# Patient Record
Sex: FEMALE | Race: WHITE | Employment: UNEMPLOYED | URBAN - NONMETROPOLITAN AREA
[De-identification: names, ages, dates, MRNs, and addresses within clinical notes are randomized per-mention and may not be internally consistent; named-entity substitution may affect disease eponyms.]

---

## 2022-05-04 DIAGNOSIS — E11.69 TYPE 2 DIABETES MELLITUS WITH OTHER SPECIFIED COMPLICATION, UNSPECIFIED WHETHER LONG TERM INSULIN USE (HCC): ICD-10-CM

## 2022-05-04 DIAGNOSIS — M51.36 DDD (DEGENERATIVE DISC DISEASE), LUMBAR: ICD-10-CM

## 2022-05-04 DIAGNOSIS — E78.2 MIXED HYPERLIPIDEMIA: ICD-10-CM

## 2022-05-04 DIAGNOSIS — M51.26 LUMBAR DISC HERNIATION: ICD-10-CM

## 2022-05-04 DIAGNOSIS — M25.552 BILATERAL HIP PAIN: ICD-10-CM

## 2022-05-04 DIAGNOSIS — E11.40 CONTROLLED TYPE 2 DIABETES MELLITUS WITH DIABETIC NEUROPATHY, UNSPECIFIED WHETHER LONG TERM INSULIN USE (HCC): ICD-10-CM

## 2022-05-04 DIAGNOSIS — G89.29 CHRONIC LOW BACK PAIN, UNSPECIFIED BACK PAIN LATERALITY, UNSPECIFIED WHETHER SCIATICA PRESENT: ICD-10-CM

## 2022-05-04 DIAGNOSIS — Q60.2 CONGENITAL ABSENCE OF KIDNEY: ICD-10-CM

## 2022-05-04 DIAGNOSIS — M25.551 BILATERAL HIP PAIN: ICD-10-CM

## 2022-05-04 DIAGNOSIS — M54.50 CHRONIC LOW BACK PAIN, UNSPECIFIED BACK PAIN LATERALITY, UNSPECIFIED WHETHER SCIATICA PRESENT: ICD-10-CM

## 2022-05-05 PROBLEM — M51.369 DDD (DEGENERATIVE DISC DISEASE), LUMBAR: Status: ACTIVE | Noted: 2022-05-05

## 2022-05-05 PROBLEM — E11.49 DM (DIABETES MELLITUS) TYPE II CONTROLLED, NEUROLOGICAL MANIFESTATION (HCC): Status: ACTIVE | Noted: 2022-05-05

## 2022-05-05 PROBLEM — E78.2 MIXED HYPERLIPIDEMIA: Status: ACTIVE | Noted: 2022-05-05

## 2022-05-05 PROBLEM — M25.551 BILATERAL HIP PAIN: Status: ACTIVE | Noted: 2022-05-05

## 2022-05-05 PROBLEM — M25.552 BILATERAL HIP PAIN: Status: ACTIVE | Noted: 2022-05-05

## 2022-05-05 PROBLEM — M54.50 CHRONIC LOW BACK PAIN: Status: ACTIVE | Noted: 2022-05-05

## 2022-05-05 PROBLEM — Q60.2 CONGENITAL ABSENCE OF KIDNEY: Status: ACTIVE | Noted: 2022-05-05

## 2022-05-05 PROBLEM — M51.36 DDD (DEGENERATIVE DISC DISEASE), LUMBAR: Status: ACTIVE | Noted: 2022-05-05

## 2022-05-05 PROBLEM — E11.69 TYPE 2 DIABETES MELLITUS WITH OTHER SPECIFIED COMPLICATION (HCC): Status: ACTIVE | Noted: 2022-05-05

## 2022-05-05 PROBLEM — G89.29 CHRONIC LOW BACK PAIN: Status: ACTIVE | Noted: 2022-05-05

## 2022-05-05 PROBLEM — M51.26 LUMBAR DISC HERNIATION: Status: ACTIVE | Noted: 2022-05-05

## 2022-05-05 RX ORDER — CHLORAL HYDRATE 500 MG
CAPSULE ORAL
COMMUNITY

## 2022-05-05 RX ORDER — IBUPROFEN 800 MG/1
800 TABLET ORAL EVERY 6 HOURS PRN
COMMUNITY
End: 2022-05-23

## 2022-05-05 RX ORDER — LOSARTAN POTASSIUM 50 MG/1
50 TABLET ORAL DAILY
COMMUNITY
End: 2022-11-01 | Stop reason: CLARIF

## 2022-05-23 ENCOUNTER — HOSPITAL ENCOUNTER (OUTPATIENT)
Dept: GENERAL RADIOLOGY | Age: 52
Discharge: HOME | End: 2022-05-25
Payer: COMMERCIAL

## 2022-05-23 ENCOUNTER — OFFICE VISIT (OUTPATIENT)
Dept: PAIN MANAGEMENT | Age: 52
End: 2022-05-23
Payer: COMMERCIAL

## 2022-05-23 VITALS
BODY MASS INDEX: 41.72 KG/M2 | HEIGHT: 67 IN | SYSTOLIC BLOOD PRESSURE: 112 MMHG | OXYGEN SATURATION: 95 % | HEART RATE: 93 BPM | WEIGHT: 265.8 LBS | DIASTOLIC BLOOD PRESSURE: 64 MMHG

## 2022-05-23 DIAGNOSIS — M54.16 LUMBAR RADICULOPATHY: ICD-10-CM

## 2022-05-23 DIAGNOSIS — M54.41 CHRONIC BILATERAL LOW BACK PAIN WITH BILATERAL SCIATICA: ICD-10-CM

## 2022-05-23 DIAGNOSIS — M16.0 BILATERAL PRIMARY OSTEOARTHRITIS OF HIP: ICD-10-CM

## 2022-05-23 DIAGNOSIS — M54.41 CHRONIC BILATERAL LOW BACK PAIN WITH BILATERAL SCIATICA: Primary | ICD-10-CM

## 2022-05-23 DIAGNOSIS — M54.42 CHRONIC BILATERAL LOW BACK PAIN WITH BILATERAL SCIATICA: Primary | ICD-10-CM

## 2022-05-23 DIAGNOSIS — M54.16 LUMBAR RADICULOPATHY: Primary | ICD-10-CM

## 2022-05-23 DIAGNOSIS — G89.29 CHRONIC BILATERAL LOW BACK PAIN WITH BILATERAL SCIATICA: Primary | ICD-10-CM

## 2022-05-23 DIAGNOSIS — M51.36 DDD (DEGENERATIVE DISC DISEASE), LUMBAR: ICD-10-CM

## 2022-05-23 DIAGNOSIS — G89.29 CHRONIC BILATERAL LOW BACK PAIN WITH BILATERAL SCIATICA: ICD-10-CM

## 2022-05-23 DIAGNOSIS — M54.42 CHRONIC BILATERAL LOW BACK PAIN WITH BILATERAL SCIATICA: ICD-10-CM

## 2022-05-23 PROCEDURE — 99213 OFFICE O/P EST LOW 20 MIN: CPT | Performed by: NURSE PRACTITIONER

## 2022-05-23 PROCEDURE — G8417 CALC BMI ABV UP PARAM F/U: HCPCS | Performed by: NURSE PRACTITIONER

## 2022-05-23 PROCEDURE — 4004F PT TOBACCO SCREEN RCVD TLK: CPT | Performed by: NURSE PRACTITIONER

## 2022-05-23 PROCEDURE — G8427 DOCREV CUR MEDS BY ELIG CLIN: HCPCS | Performed by: NURSE PRACTITIONER

## 2022-05-23 PROCEDURE — 72100 X-RAY EXAM L-S SPINE 2/3 VWS: CPT

## 2022-05-23 PROCEDURE — 3017F COLORECTAL CA SCREEN DOC REV: CPT | Performed by: NURSE PRACTITIONER

## 2022-05-23 PROCEDURE — 99204 OFFICE O/P NEW MOD 45 MIN: CPT | Performed by: NURSE PRACTITIONER

## 2022-05-23 RX ORDER — DICLOFENAC SODIUM 75 MG/1
75 TABLET, DELAYED RELEASE ORAL 2 TIMES DAILY
Qty: 60 TABLET | Refills: 3 | Status: SHIPPED
Start: 2022-05-23 | End: 2022-08-04

## 2022-05-23 RX ORDER — LIRAGLUTIDE 6 MG/ML
INJECTION SUBCUTANEOUS
COMMUNITY
Start: 2022-04-27

## 2022-05-23 RX ORDER — LOSARTAN POTASSIUM AND HYDROCHLOROTHIAZIDE 25; 100 MG/1; MG/1
TABLET ORAL
COMMUNITY
Start: 2022-03-30 | End: 2022-09-09 | Stop reason: ALTCHOICE

## 2022-05-23 RX ORDER — ATORVASTATIN CALCIUM 40 MG/1
TABLET, FILM COATED ORAL
COMMUNITY
Start: 2022-05-02

## 2022-05-23 ASSESSMENT — ENCOUNTER SYMPTOMS
GASTROINTESTINAL NEGATIVE: 1
RESPIRATORY NEGATIVE: 1
BACK PAIN: 1
SORE THROAT: 0

## 2022-05-23 NOTE — PROGRESS NOTES
Subjective:      Patient ID: Brenda Leon is a 46 y.o. female. Chief Complaint   Patient presents with    New Patient     refer Dr. Dipak Kenny Low back bilataral leg pain     HPI Initial new patient consult. Chronic low back pain for years, radiates into legs, left side is worse, worsens as she ages. Ibuprofen prn, takes the edge off. Her goal is to return to work. 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 Standing on feet 10-12 hours per day    She has done physical therapy. No relief.  No recent lumbar imaging     Pain Assessment  Location of Pain: Back  Location Modifiers: Left,Right  Severity of Pain: 7  Quality of Pain: Aching  Duration of Pain: Persistent  Frequency of Pain: Constant  Aggravating Factors: Stretching,Bending,Walking,Stairs,Standing  Limiting Behavior: Yes  Relieving Factors: Rest,Ice,Heat,Nsaids  Result of Injury: No  Work-Related Injury: No    No Known Allergies    Outpatient Medications Marked as Taking for the 5/23/22 encounter (Office Visit) with MONI Mcdonald - CNP   Medication Sig Dispense Refill    atorvastatin (LIPITOR) 40 MG tablet       VICTOZA 18 MG/3ML SOPN SC injection       losartan-hydroCHLOROthiazide (HYZAAR) 100-25 MG per tablet       diclofenac (VOLTAREN) 75 MG EC tablet Take 1 tablet by mouth 2 times daily 60 tablet 3    losartan (COZAAR) 50 MG tablet Take 50 mg by mouth daily      metFORMIN (GLUCOPHAGE) 500 MG tablet Take 500 mg by mouth 2 times daily (with meals)      Omega-3 1000 MG CAPS Take by mouth         Past Medical History:   Diagnosis Date    Chronic pain     Type 2 diabetes mellitus ([address]        Past Surgical History:   Procedure Laterality Date    CARPAL TUNNEL RELEASE  2016    HERNIA REPAIR  2018    OTHER SURGICAL HISTORY  2018    RFA     TONSILLECTOMY  2010    TUBAL LIGATION         Family History   Problem Relation Age of Onset    COPD Mother     Hypertension Mother     Hypertension Father     Diabetes Father        Social History     Socioeconomic History    Marital status: Single     Spouse name: None    Number of children: None    Years of education: None    Highest education level: None   Occupational History    None   Tobacco Use    Smoking status: Current Every Day Smoker     Packs/day: 1.00     Types: Cigarettes    Smokeless tobacco: Never Used   Substance and Sexual Activity    Alcohol use: Never    Drug use: Never    Sexual activity: None   Other Topics Concern    None   Social History Narrative    None     Social Determinants of Health     Financial Resource Strain:     Difficulty of Paying Living Expenses: Not on file   Food Insecurity:     Worried About Running Out of Food in the Last Year: Not on file    Yarelis of Food in the Last Year: Not on file   Transportation Needs:     Lack of Transportation (Medical): Not on file    Lack of Transportation (Non-Medical): Not on file   Physical Activity:     Days of Exercise per Week: Not on file    Minutes of Exercise per Session: Not on file   Stress:     Feeling of Stress : Not on file   Social Connections:     Frequency of Communication with Friends and Family: Not on file    Frequency of Social Gatherings with Friends and Family: Not on file    Attends [employer]ism Services: Not on file    Active Member of [address] [place] [address] [employer] or Organizations: Not on file    Attends Club or Organization Meetings: Not on file    Marital Status: Not on file   Intimate Partner Violence:     Fear of Current or Ex-Partner: Not on file    Emotionally Abused: Not on file    Physically Abused: Not on file    Sexually Abused: Not on file   Housing Stability:     Unable to Pay for Housing in the Last Year: Not on file    Number of [place] in the Last Year: Not on file    Unstable Housing in the Last Year: Not on file     Review of Systems   Constitutional: Positive for activity change. Negative for fever.    HENT: Negative for sore throat. Respiratory: Negative. Cardiovascular: Negative for leg swelling. Gastrointestinal: Negative. Genitourinary: Negative. Musculoskeletal: Positive for arthralgias, back pain and myalgias. Skin: Negative. Neurological: Positive for weakness (both knees). Hematological: Bruises/bleeds easily. Psychiatric/Behavioral: Positive for sleep disturbance. Objective:   Physical Exam  Vitals reviewed. Constitutional:       General: She is awake. She is not in acute distress. Appearance: Normal appearance. She is well-developed. She is obese. She is not ill-appearing, toxic-appearing or diaphoretic. Interventions: She is not intubated. HENT:      Head: Normocephalic and atraumatic. Right Ear: External ear normal.      Left Ear: External ear normal.   Eyes:      General: Lids are normal.   Cardiovascular:      Rate and Rhythm: Normal rate. Pulmonary:      Effort: Pulmonary effort is normal. No tachypnea, bradypnea, accessory muscle usage, prolonged expiration, respiratory distress or retractions. She is not intubated. Abdominal:      General: Abdomen is protuberant. Palpations: Abdomen is soft. Musculoskeletal:      Lumbar back: Tenderness (left gluteal myofascia) present. Decreased range of motion (flexion is worse than extension). Skin:     General: Skin is warm and dry. Capillary Refill: Capillary refill takes less than 2 seconds. Coloration: Skin is not ashen, jaundiced or pale. Findings: No rash. Nails: There is no clubbing. Neurological:      Mental Status: She is alert and oriented to person, place, and time. GCS: GCS eye subscore is 4. GCS verbal subscore is 5. GCS motor subscore is 6. Cranial Nerves: No cranial nerve deficit.    Psychiatric:         Attention and Perception: Attention and perception normal.         Mood and Affect: Mood and affect normal.         Speech: Speech normal.         Behavior: Behavior is cooperative. Cognition and Memory: Cognition normal.         Judgment: Judgment normal.         Assessment / Plan:       Diagnosis Orders   1. Chronic bilateral low back pain with bilateral sciatica  XR LUMBAR SPINE (2-3 VIEWS)    diclofenac (VOLTAREN) 75 MG EC tablet   2. DDD (degenerative disc disease), lumbar     3. Bilateral primary osteoarthritis of hip     4. Lumbar radiculopathy       Hip xrays positive for osteoarthritis. Will obtain lumbar spine xray, likely MRI. To start diclofenac 75mg bid.  RTC 1 month

## 2022-08-04 ENCOUNTER — TELEPHONE (OUTPATIENT)
Dept: PAIN MANAGEMENT | Age: 52
End: 2022-08-04

## 2022-08-04 RX ORDER — GABAPENTIN 100 MG/1
CAPSULE ORAL
Qty: 90 CAPSULE | Refills: 1 | Status: SHIPPED | OUTPATIENT
Start: 2022-08-04 | End: 2022-09-09

## 2022-08-04 NOTE — TELEPHONE ENCOUNTER
The patient called because she cannot take Diclofenac as she only has one Kidney and her provider took her off of it. Is there an alternative that is better on her kidneys. She stated that his only advise was tylenol.      Last Appt:  5/23/2022  Next Appt:   Visit date not found  [facility]

## 2022-09-09 ENCOUNTER — OFFICE VISIT (OUTPATIENT)
Dept: PAIN MANAGEMENT | Age: 52
End: 2022-09-09
Payer: COMMERCIAL

## 2022-09-09 ENCOUNTER — TELEPHONE (OUTPATIENT)
Dept: PAIN MANAGEMENT | Age: 52
End: 2022-09-09

## 2022-09-09 VITALS
WEIGHT: 257.4 LBS | SYSTOLIC BLOOD PRESSURE: 130 MMHG | RESPIRATION RATE: 16 BRPM | BODY MASS INDEX: 40.31 KG/M2 | DIASTOLIC BLOOD PRESSURE: 80 MMHG | HEART RATE: 95 BPM | OXYGEN SATURATION: 95 %

## 2022-09-09 DIAGNOSIS — M47.816 LUMBAR FACET JOINT SYNDROME: Primary | ICD-10-CM

## 2022-09-09 DIAGNOSIS — M54.50 CHRONIC LOW BACK PAIN, UNSPECIFIED BACK PAIN LATERALITY, UNSPECIFIED WHETHER SCIATICA PRESENT: ICD-10-CM

## 2022-09-09 DIAGNOSIS — M51.26 LUMBAR DISC HERNIATION: ICD-10-CM

## 2022-09-09 DIAGNOSIS — M51.36 DDD (DEGENERATIVE DISC DISEASE), LUMBAR: ICD-10-CM

## 2022-09-09 DIAGNOSIS — M54.16 LUMBAR RADICULOPATHY: ICD-10-CM

## 2022-09-09 DIAGNOSIS — G89.29 CHRONIC LOW BACK PAIN, UNSPECIFIED BACK PAIN LATERALITY, UNSPECIFIED WHETHER SCIATICA PRESENT: ICD-10-CM

## 2022-09-09 PROCEDURE — G8427 DOCREV CUR MEDS BY ELIG CLIN: HCPCS | Performed by: NURSE PRACTITIONER

## 2022-09-09 PROCEDURE — 99215 OFFICE O/P EST HI 40 MIN: CPT | Performed by: NURSE PRACTITIONER

## 2022-09-09 PROCEDURE — G8417 CALC BMI ABV UP PARAM F/U: HCPCS | Performed by: NURSE PRACTITIONER

## 2022-09-09 PROCEDURE — 99214 OFFICE O/P EST MOD 30 MIN: CPT | Performed by: NURSE PRACTITIONER

## 2022-09-09 PROCEDURE — 4004F PT TOBACCO SCREEN RCVD TLK: CPT | Performed by: NURSE PRACTITIONER

## 2022-09-09 PROCEDURE — 3017F COLORECTAL CA SCREEN DOC REV: CPT | Performed by: NURSE PRACTITIONER

## 2022-09-09 RX ORDER — SITAGLIPTIN 100 MG/1
TABLET, FILM COATED ORAL
COMMUNITY
Start: 2022-09-03

## 2022-09-09 RX ORDER — METFORMIN HYDROCHLORIDE 500 MG/1
TABLET, EXTENDED RELEASE ORAL
COMMUNITY
Start: 2022-06-06 | End: 2022-11-01 | Stop reason: ALTCHOICE

## 2022-09-09 RX ORDER — DAPAGLIFLOZIN 5 MG/1
TABLET, FILM COATED ORAL
COMMUNITY
Start: 2022-08-31

## 2022-09-09 RX ORDER — TRAMADOL HYDROCHLORIDE 50 MG/1
50 TABLET ORAL 2 TIMES DAILY PRN
Qty: 60 TABLET | Refills: 0 | Status: SHIPPED | OUTPATIENT
Start: 2022-09-09 | End: 2022-10-09

## 2022-09-09 RX ORDER — DICLOFENAC SODIUM 75 MG/1
TABLET, DELAYED RELEASE ORAL
COMMUNITY
Start: 2022-08-19 | End: 2022-09-09

## 2022-09-09 ASSESSMENT — PATIENT HEALTH QUESTIONNAIRE - PHQ9
SUM OF ALL RESPONSES TO PHQ QUESTIONS 1-9: 0
SUM OF ALL RESPONSES TO PHQ QUESTIONS 1-9: 0
1. LITTLE INTEREST OR PLEASURE IN DOING THINGS: 0
2. FEELING DOWN, DEPRESSED OR HOPELESS: 0
SUM OF ALL RESPONSES TO PHQ9 QUESTIONS 1 & 2: 0
SUM OF ALL RESPONSES TO PHQ QUESTIONS 1-9: 0
SUM OF ALL RESPONSES TO PHQ QUESTIONS 1-9: 0

## 2022-09-09 ASSESSMENT — ENCOUNTER SYMPTOMS
SORE THROAT: 0
RESPIRATORY NEGATIVE: 1
GASTROINTESTINAL NEGATIVE: 1
BACK PAIN: 1

## 2022-09-09 NOTE — PROGRESS NOTES
Subjective:      Patient ID: Tho Montoya is a 46 y.o. female. Chief Complaint   Patient presents with    Back Pain     Back pain and Rx ineffective. HPI Follow up after initial new patient consult. Chronic low back pain for years, radiates into legs, left side is worse, worsens as she ages. Ibuprofen prn, takes the edge off-one kidney, poor NSAID candidate. Started on gabapentin, no relief, makes her drowsy at lower dose. Failed cymbalta in the past, alleviated neuropathic pain, but then stopped working. Her goal is to return to work. 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 Standing on feet 10-12 hours per day    She has done physical therapy. No relief. No recent lumbar imaging. Evaluated neurosurgeon, she is not a surgical candidate. Pain Assessment  Location of Pain: Back  Location Modifiers: Medial, Left, Right, Posterior, Inferior  Severity of Pain: 7  Quality of Pain: Aching  Duration of Pain: Persistent  Frequency of Pain: Constant  Aggravating Factors: Standing  Limiting Behavior: Yes  Relieving Factors: Rest  Result of Injury: No  Work-Related Injury: No    No Known Allergies    Outpatient Medications Marked as Taking for the 9/9/22 encounter (Office Visit) with MONI Mcintyre CNP   Medication Sig Dispense Refill    FARXIGA 5 MG tablet       JANUVIA 100 MG tablet       traMADol (ULTRAM) 50 MG tablet Take 1 tablet by mouth 2 times daily as needed for Pain for up to 30 days.  60 tablet 0    atorvastatin (LIPITOR) 40 MG tablet       VICTOZA 18 MG/3ML SOPN SC injection       losartan (COZAAR) 50 MG tablet Take 50 mg by mouth daily      Omega-3 1000 MG CAPS Take by mouth         Past Medical History:   Diagnosis Date    Chronic pain     Type 2 diabetes mellitus ([facility] [address]        Past Surgical History:   Procedure Laterality Date    CARPAL TUNNEL RELEASE  2016    HERNIA REPAIR  2018    OTHER SURGICAL HISTORY  2018 RFA     TONSILLECTOMY  2010    TUBAL LIGATION         Family History   Problem Relation Age of Onset    COPD Mother     Hypertension Mother     Hypertension Father     Diabetes Father        Social History     Socioeconomic History    Marital status: Single     Spouse name: None    Number of children: None    Years of education: None    Highest education level: None   Tobacco Use    Smoking status: Every Day     Packs/day: 1.00     Types: Cigarettes    Smokeless tobacco: Never    Tobacco comments: Will see PCP PRN cessation needs. Substance and Sexual Activity    Alcohol use: Never    Drug use: Never     Review of Systems   Constitutional:  Positive for activity change. Negative for fever. HENT:  Negative for sore throat. Respiratory: Negative. Cardiovascular:  Negative for leg swelling. Gastrointestinal: Negative. Genitourinary: Negative. Musculoskeletal:  Positive for arthralgias, back pain and myalgias. Skin: Negative. Neurological:  Positive for weakness (both knees). Hematological:  Bruises/bleeds easily. Psychiatric/Behavioral:  Positive for sleep disturbance. Objective:   Physical Exam  Vitals reviewed. Constitutional:       General: She is awake. She is not in acute distress. Appearance: Normal appearance. She is well-developed. She is obese. She is not ill-appearing, toxic-appearing or diaphoretic. Interventions: She is not intubated. HENT:      Head: Normocephalic and atraumatic. Right Ear: External ear normal.      Left Ear: External ear normal.   Eyes:      General: Lids are normal.   Cardiovascular:      Rate and Rhythm: Normal rate. Pulmonary:      Effort: Pulmonary effort is normal. No tachypnea, bradypnea, accessory muscle usage, prolonged expiration, respiratory distress or retractions. She is not intubated. Abdominal:      General: Abdomen is protuberant. Palpations: Abdomen is soft.    Musculoskeletal:      Lumbar back: Tenderness (left gluteal myofascia) present. Decreased range of motion (flexion is worse than extension). Skin:     General: Skin is warm and dry. Capillary Refill: Capillary refill takes less than 2 seconds. Coloration: Skin is not ashen, jaundiced or pale. Findings: No rash. Nails: There is no clubbing. Neurological:      Mental Status: She is alert and oriented to person, place, and time. GCS: GCS eye subscore is 4. GCS verbal subscore is 5. GCS motor subscore is 6. Cranial Nerves: No cranial nerve deficit. Psychiatric:         Attention and Perception: Attention and perception normal.         Mood and Affect: Mood and affect normal.         Speech: Speech normal.         Behavior: Behavior is cooperative. Cognition and Memory: Cognition normal.         Judgment: Judgment normal.       Assessment / Plan:       Diagnosis Orders   1. Lumbar facet joint syndrome  traMADol (ULTRAM) 50 MG tablet      2. DDD (degenerative disc disease), lumbar  traMADol (ULTRAM) 50 MG tablet      3. Lumbar radiculopathy  traMADol (ULTRAM) 50 MG tablet      4. Lumbar disc herniation  traMADol (ULTRAM) 50 MG tablet      5. Chronic low back pain, unspecified back pain laterality, unspecified whether sciatica present  traMADol (ULTRAM) 50 MG tablet        Discontinue gabapentin  Tramadol bid prn  Schedule bilateral L4/L5 and L5/S1 diagnostic medial branch block    Informed consent has not been obtained for procedure. Sona Shaggy not on blood thinners. Medications to hold have been reviewed with patient. Blood thinners must be held with permission from your cardiologist or primary care physician. A letter is not required to besent to PCP/Cardiologist regarding holding medications for procedure to decrease bleeding risk.        Controlled Substance Monitoring:    Acute and Chronic Pain Monitoring:   RX Monitoring 9/9/2022   Periodic Controlled Substance Monitoring No signs of potential drug abuse or diversion identified.

## 2022-09-09 NOTE — TELEPHONE ENCOUNTER
Bilateral L4-L5 L5-S1 Facet  with IV  sedation scheduled for 9/23/22 with surgery center notified. Patient Educated to have .

## 2022-09-09 NOTE — PATIENT INSTRUCTIONS
[address]  Telephone [phone]  Fax [phone]    PROCEDURE INSTRUCTIONS FOR  PAIN MANAGEMENT PROCEDURES WITH ANESTHESIA/ IV SEDATION    Taiwo Sutherland is scheduled to see Dr. Joaquin Renae to undergo the following procedure:   Bilateral L4-L5 L5-S1 Facet Injection     Procedure Date: ____9/23/22____  You will receive a phone call to schedule COVID 19 testing. You will receive a phone call the day prior to your procedure to confirm a time or arrival.    Report to the [place] [address], Registration office on the 1st floor in the hospital, after check in and signing of paper work you will then go to the second floor to the surgery center. 1. Stop the following medications prior to the procedure:   none  Stop blood thinners as directed before the injection, with permission from your cardiologist or primary care physician. We will send a letter to them requesting permission to hold the blood thinners. If you take Warfarin (Coumadin), you must have your blood drawn for an INR the day before the procedure. INR must be less than 1.5. If not completed prior to check in for the procedure this will be completed by the procedure staff before the procedure can be complete. 2.  Take all routine medications unless otherwise instructed. Ok to take vitamins and antiinflammatory medications    3. EATING & DRINKING:  YOUR PROCEDURE REQUIRES ANESTHESIA, NO FOOD OR LIQUIDS FOR 8 HOURS PRIOR TO THE PROCEDURE    4. If you are allergic to contrast or iodine, you must take benadryl and prednisone prior to the injection to prevent an allergic reaction. Follow the directions on the prescription for the times to take the medication. 5.  Wear simple loose clothing, which can be easily changed. 6.  Leave jewelry (including rings) and other valuables at home. 7.  Make arrangements for a family member or friend to drive you to the surgery center.   Your ride must stay in the hospital while you are having the injection done. If they cannot stay, the injection will be rescheduled. The sedation may affect your judgment following the procedure and driving a vehicle within 24 hours after the sedation could be dangerous. 8.  You will be asked to sign several forms prior to surgery; patients under the age of 25 must have a parent or legal guardian sign the permit to be able to do the procedure. 9.  You must have finished any antibiotic prescribed for recent infections. If required, please take pre-procedure antibiotic or other pre-procedure medications as instructed. 10. Bring inhalers and pain medications with you to your procedure. 11. Bring your MRI/CT films if they were done outside of the [facility]. 12. If you should develop a cold, sore throat, cough, fever or other new indication of illness or infection, or are started on antibiotics within 2 weeks of the scheduled procedure, please notify the [facility] office as early as possible at [phone]. If calling after 4:30pm the day prior to your scheduled procedure please contact [phone] and Leave a Voice Message.

## 2022-09-20 ENCOUNTER — TELEPHONE (OUTPATIENT)
Dept: PAIN MANAGEMENT | Age: 52
End: 2022-09-20

## 2022-09-20 NOTE — TELEPHONE ENCOUNTER
Letter of denial for Bilateral L4-L5 L5-S1 Facet Injection received and scanned into media for your review. DOS:  9/23/22. Letter states:  \"the pain moved into both legs. The type of pain shot ordered is for pain that does not move to other parts of the body\". As P2P can be done by calling [phone]. Fax # [phone] for appeals.      Member ID [id number]

## 2022-09-21 NOTE — TELEPHONE ENCOUNTER
LM #1 with patient notifying her that procedure for 9/23/22 will be canceled and rescheduled. Next opening is 10/6/22. Surgery center notified.

## 2022-09-25 DIAGNOSIS — G89.29 CHRONIC BILATERAL LOW BACK PAIN WITH BILATERAL SCIATICA: ICD-10-CM

## 2022-09-25 DIAGNOSIS — M54.41 CHRONIC BILATERAL LOW BACK PAIN WITH BILATERAL SCIATICA: ICD-10-CM

## 2022-09-25 DIAGNOSIS — M54.42 CHRONIC BILATERAL LOW BACK PAIN WITH BILATERAL SCIATICA: ICD-10-CM

## 2022-09-26 RX ORDER — DICLOFENAC SODIUM 75 MG/1
TABLET, DELAYED RELEASE ORAL
Qty: 60 TABLET | Refills: 3 | Status: SHIPPED | OUTPATIENT
Start: 2022-09-26

## 2022-10-03 RX ORDER — GABAPENTIN 100 MG/1
CAPSULE ORAL
Qty: 90 CAPSULE | Refills: 1 | Status: SHIPPED | OUTPATIENT
Start: 2022-10-03 | End: 2023-04-03

## 2022-10-06 ENCOUNTER — APPOINTMENT (OUTPATIENT)
Dept: GENERAL RADIOLOGY | Age: 52
End: 2022-10-06
Attending: PHYSICAL MEDICINE & REHABILITATION
Payer: COMMERCIAL

## 2022-10-06 ENCOUNTER — HOSPITAL ENCOUNTER (OUTPATIENT)
Age: 52
Setting detail: OUTPATIENT SURGERY
Discharge: HOME | End: 2022-10-06
Attending: PHYSICAL MEDICINE & REHABILITATION | Admitting: PHYSICAL MEDICINE & REHABILITATION
Payer: COMMERCIAL

## 2022-10-06 VITALS
OXYGEN SATURATION: 96 % | BODY MASS INDEX: 40.02 KG/M2 | TEMPERATURE: 97.9 F | HEIGHT: 67 IN | WEIGHT: 255 LBS | HEART RATE: 91 BPM | SYSTOLIC BLOOD PRESSURE: 113 MMHG | RESPIRATION RATE: 16 BRPM | DIASTOLIC BLOOD PRESSURE: 83 MMHG

## 2022-10-06 LAB — GLUCOSE BLD-MCNC: 165 MG/DL (ref 65–105)

## 2022-10-06 PROCEDURE — 2500000003 HC RX 250 WO HCPCS: Performed by: PHYSICAL MEDICINE & REHABILITATION

## 2022-10-06 PROCEDURE — 6360000002 HC RX W HCPCS: Performed by: PHYSICAL MEDICINE & REHABILITATION

## 2022-10-06 PROCEDURE — 3600000056 HC PAIN LEVEL 4 BASE: Performed by: PHYSICAL MEDICINE & REHABILITATION

## 2022-10-06 PROCEDURE — 2709999900 HC NON-CHARGEABLE SUPPLY: Performed by: PHYSICAL MEDICINE & REHABILITATION

## 2022-10-06 PROCEDURE — 64494 INJ PARAVERT F JNT L/S 2 LEV: CPT | Performed by: PHYSICAL MEDICINE & REHABILITATION

## 2022-10-06 PROCEDURE — 3209999900 FLUORO FOR SURGICAL PROCEDURES

## 2022-10-06 PROCEDURE — 7100000010 HC PHASE II RECOVERY - FIRST 15 MIN: Performed by: PHYSICAL MEDICINE & REHABILITATION

## 2022-10-06 PROCEDURE — 6360000004 HC RX CONTRAST MEDICATION: Performed by: PHYSICAL MEDICINE & REHABILITATION

## 2022-10-06 PROCEDURE — 64493 INJ PARAVERT F JNT L/S 1 LEV: CPT | Performed by: PHYSICAL MEDICINE & REHABILITATION

## 2022-10-06 PROCEDURE — 82947 ASSAY GLUCOSE BLOOD QUANT: CPT

## 2022-10-06 RX ORDER — SODIUM CHLORIDE 0.9 % (FLUSH) 0.9 %
5-40 SYRINGE (ML) INJECTION PRN
Status: CANCELLED | OUTPATIENT
Start: 2022-10-06

## 2022-10-06 RX ORDER — SODIUM CHLORIDE 0.9 % (FLUSH) 0.9 %
5-40 SYRINGE (ML) INJECTION EVERY 12 HOURS SCHEDULED
Status: CANCELLED | OUTPATIENT
Start: 2022-10-06

## 2022-10-06 RX ORDER — LIDOCAINE HYDROCHLORIDE 20 MG/ML
INJECTION, SOLUTION EPIDURAL; INFILTRATION; INTRACAUDAL; PERINEURAL PRN
Status: DISCONTINUED | OUTPATIENT
Start: 2022-10-06 | End: 2022-10-06 | Stop reason: ALTCHOICE

## 2022-10-06 RX ORDER — SODIUM CHLORIDE 9 MG/ML
INJECTION, SOLUTION INTRAVENOUS PRN
Status: CANCELLED | OUTPATIENT
Start: 2022-10-06

## 2022-10-06 RX ORDER — DEXAMETHASONE SODIUM PHOSPHATE 10 MG/ML
INJECTION INTRAMUSCULAR; INTRAVENOUS PRN
Status: DISCONTINUED | OUTPATIENT
Start: 2022-10-06 | End: 2022-10-06 | Stop reason: ALTCHOICE

## 2022-10-06 ASSESSMENT — ENCOUNTER SYMPTOMS
RESPIRATORY NEGATIVE: 1
BACK PAIN: 1
SORE THROAT: 0
GASTROINTESTINAL NEGATIVE: 1

## 2022-10-06 ASSESSMENT — PAIN DESCRIPTION - DESCRIPTORS: DESCRIPTORS: ACHING

## 2022-10-06 ASSESSMENT — PAIN - FUNCTIONAL ASSESSMENT: PAIN_FUNCTIONAL_ASSESSMENT: 0-10

## 2022-10-06 NOTE — DISCHARGE INSTRUCTIONS
Home Care after Facet Joint Injection    The doctor has done an injection to help diagnose the cause and site of your pain. You may feel sore at the injection site for the next 2-4 days. You may apply ice to the site for 20 minutes on and 20 minutes off to decrease pain and discomfort, if needed, for the first 24 hours. After 24 hours, you may use heat if needed. You will be given a pain log to complete for the next 14 days. Complete this form and make a copy for your own records. Then, mail it back to us or drop it off at the pain management clinic. We will need this information to decide the next step in your treatment plan. It is important that you do the activities that most often cause or intensify your pain the first 24 hours after the injection. Record your results on the pain log as directed. Do not nap. Try to limit the use of pain medicines if you can during the first 24 hours. You may resume taking your medicines after the procedure. Our staff will tell you how to take your pain medicines. No baths or soaking the injection site for 24 hours after the procedure. Taking a shower today is okay. You may resume taking your routine medicines after the procedure including pain medicines as prescribed. Remember to limit the use of pain medications the first 24 hours. Resume any medications held for the procedure (blood thinners, aspirin, anti-inflammatories). If you do not already have a follow-up appointment, call your referring doctors office to make one to discuss your results within 2-4 weeks after the treatment. Your doctor will have the report within 7-10 days. Signs of infection  Fever greater than 100.4°F by mouth for 2 readings taken 4 hours apart  Increased redness, swelling around the site  Any drainage from the site     If you have any new symptoms or any signs of infection, please call [phone] during business hours to notify us.  You can also notify your primary care physician. After hours, nights and weekends, call [phone].

## 2022-10-06 NOTE — H&P
Subjective:      Patient ID: Sathish Schmidt is a 46 y.o. female. Chief Complaint   Patient presents with    Back Pain     Back pain and Rx ineffective. HPI Follow up after initial new patient consult. Chronic low back pain for years, radiates into legs, left side is worse, worsens as she ages. Ibuprofen prn, takes the edge off-one kidney, poor NSAID candidate. Started on gabapentin, no relief, makes her drowsy at lower dose. Failed cymbalta in the past, alleviated neuropathic pain, but then stopped working. Her goal is to return to work. 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 Standing on feet 10-12 hours per day    She has done physical therapy. No relief. No recent lumbar imaging. Evaluated neurosurgeon, she is not a surgical candidate. Pain Assessment  Location of Pain: Back  Location Modifiers: Medial, Left, Right, Posterior, Inferior  Severity of Pain: 7  Quality of Pain: Aching  Duration of Pain: Persistent  Frequency of Pain: Constant  Aggravating Factors: Standing  Limiting Behavior: Yes  Relieving Factors: Rest  Result of Injury: No  Work-Related Injury: No    No Known Allergies    Outpatient Medications Marked as Taking for the 9/9/22 encounter (Office Visit) with MONI Dupont CNP   Medication Sig Dispense Refill    FARXIGA 5 MG tablet       JANUVIA 100 MG tablet       traMADol (ULTRAM) 50 MG tablet Take 1 tablet by mouth 2 times daily as needed for Pain for up to 30 days.  60 tablet 0    atorvastatin (LIPITOR) 40 MG tablet       VICTOZA 18 MG/3ML SOPN SC injection       losartan (COZAAR) 50 MG tablet Take 50 mg by mouth daily      Omega-3 1000 MG CAPS Take by mouth         Past Medical History:   Diagnosis Date    Chronic pain     Type 2 diabetes mellitus ([facility] [address]        Past Surgical History:   Procedure Laterality Date    CARPAL TUNNEL RELEASE  2016    HERNIA REPAIR  2018    OTHER SURGICAL HISTORY  2018 RFA     TONSILLECTOMY  2010    TUBAL LIGATION         Family History   Problem Relation Age of Onset    COPD Mother     Hypertension Mother     Hypertension Father     Diabetes Father        Social History     Socioeconomic History    Marital status: Single     Spouse name: None    Number of children: None    Years of education: None    Highest education level: None   Tobacco Use    Smoking status: Every Day     Packs/day: 1.00     Types: Cigarettes    Smokeless tobacco: Never    Tobacco comments: Will see PCP PRN cessation needs. Substance and Sexual Activity    Alcohol use: Never    Drug use: Never     Review of Systems   Constitutional:  Positive for activity change. Negative for fever. HENT:  Negative for sore throat. Respiratory: Negative. Cardiovascular:  Negative for leg swelling. Gastrointestinal: Negative. Genitourinary: Negative. Musculoskeletal:  Positive for arthralgias, back pain and myalgias. Skin: Negative. Neurological:  Positive for weakness (both knees). Hematological:  Bruises/bleeds easily. Psychiatric/Behavioral:  Positive for sleep disturbance. Objective:   Physical Exam  Vitals reviewed. Constitutional:       General: She is awake. She is not in acute distress. Appearance: Normal appearance. She is well-developed. She is obese. She is not ill-appearing, toxic-appearing or diaphoretic. Interventions: She is not intubated. HENT:      Head: Normocephalic and atraumatic. Right Ear: External ear normal.      Left Ear: External ear normal.   Eyes:      General: Lids are normal.   Cardiovascular:      Rate and Rhythm: Normal rate. Pulmonary:      Effort: Pulmonary effort is normal. No tachypnea, bradypnea, accessory muscle usage, prolonged expiration, respiratory distress or retractions. She is not intubated. Abdominal:      General: Abdomen is protuberant. Palpations: Abdomen is soft.    Musculoskeletal:      Lumbar back: Tenderness (left gluteal myofascia) present. Decreased range of motion (flexion is worse than extension). Skin:     General: Skin is warm and dry. Capillary Refill: Capillary refill takes less than 2 seconds. Coloration: Skin is not ashen, jaundiced or pale. Findings: No rash. Nails: There is no clubbing. Neurological:      Mental Status: She is alert and oriented to person, place, and time. GCS: GCS eye subscore is 4. GCS verbal subscore is 5. GCS motor subscore is 6. Cranial Nerves: No cranial nerve deficit. Psychiatric:         Attention and Perception: Attention and perception normal.         Mood and Affect: Mood and affect normal.         Speech: Speech normal.         Behavior: Behavior is cooperative. Cognition and Memory: Cognition normal.         Judgment: Judgment normal.       Assessment / Plan:       Diagnosis Orders   1. Lumbar facet joint syndrome  traMADol (ULTRAM) 50 MG tablet      2. DDD (degenerative disc disease), lumbar  traMADol (ULTRAM) 50 MG tablet      3. Lumbar radiculopathy  traMADol (ULTRAM) 50 MG tablet      4. Lumbar disc herniation  traMADol (ULTRAM) 50 MG tablet      5. Chronic low back pain, unspecified back pain laterality, unspecified whether sciatica present  traMADol (ULTRAM) 50 MG tablet        Discontinue gabapentin  Tramadol bid prn  Schedule bilateral L4/L5 and L5/S1 diagnostic medial branch block    Informed consent has not been obtained for procedure. Sona Shaggy not on blood thinners. Medications to hold have been reviewed with patient. Blood thinners must be held with permission from your cardiologist or primary care physician. A letter is not required to besent to PCP/Cardiologist regarding holding medications for procedure to decrease bleeding risk.        Controlled Substance Monitoring:    Acute and Chronic Pain Monitoring:   RX Monitoring 9/9/2022   Periodic Controlled Substance Monitoring No signs of potential drug abuse or diversion identified.

## 2022-10-06 NOTE — INTERVAL H&P NOTE
I have interviewed and examined the patient and reviewed the recent History and Physical.  There have been no changes to the recent H&P documentation. The surgical consent form has been signed. Last anticoagulant medication use was:na    Premedication taken for contrast allergy? No    Valium taken for oral sedation? No    No outpatient medications have been marked as taking for the 10/6/22 encounter [facility] Encounter). The patient understands the planned operation and its associated risks and benefits and agrees to proceed.         Electronically signed by Curtis Le MD on 10/6/2022 at 9:45 AM

## 2022-10-10 NOTE — OP NOTE
ZYGAPOPHYSEAL JOINT INJECTION    10/9/22    Surgeon: Sahara Ku MD    Pre-operative Diagnosis:   Hospital Problems             Last Modified POA    * (Principal) Lumbar radiculopathy 10/6/2022 Yes    Lumbar disc herniation 10/6/2022 Yes        Post-operative Diagnosis: Same    INDICATION:  Previous treatment and examination findings are noted in the H&P and previous clinic notes. Bilateral L4-5 -5S1 facet joint injections have been requested for diagnostic and therapeutic reasons. Conservative treatment was ineffective i.e.: ice, NSAIDS, rest, narcotic medication, chiropractic care, physical therapy and message therapy. Patient is unable to perform the following ADL's: ambulating, grooming, sitting, and standing    Pain Assessment: 0-10  Pain Level: 6     Pain Orientation: Lower  Pain Location: Back  Pain Descriptors: Aching    Last Plain films: 2021      EXAMINATION:   BL4-55 -S1 facet joint injections with arthrography. CONSENT:  Written consent was obtained from the patient on preprinted consent form after explaining the procedure, indications, potential complications and outcomes. Alternative treatments were also discussed. DISCUSSION:  The patient was sterilely prepped and draped in the usual fashion in the prone position. Time out\" was verified for correct patient, side, level and procedure. SEDATION:  No conscious sedation was performed during the procedure. The patient remained awake and conversed throughout the procedure. The patient underwent pulse oximetry and blood pressure monitoring independently by a trained observer, as well as by a physician. PROCEDURE[de-identified]  Under image-intensifier control, a standard technique was employed, a 22 gauge needle x 5BL4-5 5-S1 inch spinal needle was guided successfully to cannulate the BL4-55 -S1 zygapophyseal joint via a posterior lateral approach.     Instillation of .5 mL of Omnipaque 240 contrast medium demonstrated contiguous flow into the joint and the joint capsule. No vascular spread was noted. Digital subtraction was not employed to evaluate for vascular spread.] The patient was monitored for any untoward reaction to contrast medium before proceeding with procedure #2. Dexamethasone (Decadron 10 mg/mL) and Mmethylprednisolone (Depo-Medrol 80 mg/mL) was injected into each joint. A total of 4 joints were injected. PROVOCATION: The patient did not report pain reproduction in a concordant distribution upon capsular distention of the lumbar facet joints from the contrast injection. ARTHROGRAPHY: The lumbar facet arthrogram revealed contrast in the joint space in the superior and inferior recesses; no contrast extravasation. The patient tolerated the procedure well and without complications and was noted to be in stable condition prior to discharge from the procedure center with discharge instructions. IMPRESSIONS:  lumbar facet arthrogram is abnormal: .  lumbar facet joint injection negative for provocation of the patient's pain symptoms. EBL: no blood loss    SPECIMEN: none    RECOMMENDATIONS:  Complete and return Post-Procedure Pain and Activity Diary.   Contact the [address] for symptom exacerbation, fever or unusual symptoms. Post-procedure care according to verbal and written discharge instructions  Continue with PT as per MD directions. Consider diagnositc MBB if there is > 80% pain relief and improved activity scores. SPINE FLUOROSCOPIC IMAGE INTERPRETATION    EXAMINATION:  AP, Lateral, and Oblique views. FLUORO TIME: 12 seconds    DISCUSSION:  Spot views of the spine reveal normal alignment and segmentation. Spinal needles are positioned in the lumbar facet joint. Contrast outlines the joint space and reveals . Visualized spine reveals disc space . Soft tissues reveal no abnormalities.     IMPRESSION: lumbar facet arthrogram shows satisfactory needle placement and contrast dispersal.    Electronically signed by Sahara Ku MD on 10/9/2022 at 8:18 PM.   The patient was counseled at length about the risks of zully Covid-19 during their perioperative period and any recovery window from their procedure. The patient was made aware that zully Covid-19  may worsen their prognosis for recovering from their procedure  and lend to a higher morbidity and/or mortality risk. All material risks, benefits, and reasonable alternatives including postponing the procedure were discussed. The patient does wish to proceed with the procedure at this time. The patient was counseled at length about the risks of zully Covid-19 during their perioperative period and any recovery window from their procedure. The patient was made aware that zully Covid-19  may worsen their prognosis for recovering from their procedure  and lend to a higher morbidity and/or mortality risk. All material risks, benefits, and reasonable alternatives including postponing the procedure were discussed. The patient does wish to proceed with the procedure at this time. The patient was counseled at length about the risks of zully Covid-19 during their perioperative period and any recovery window from their procedure. The patient was made aware that zully Covid-19  may worsen their prognosis for recovering from their procedure  and lend to a higher morbidity and/or mortality risk. All material risks, benefits, and reasonable alternatives including postponing the procedure were discussed. The patient does wish to proceed with the procedure at this time.   Jennifer Olivo

## 2022-11-01 ENCOUNTER — TELEPHONE (OUTPATIENT)
Dept: PAIN MANAGEMENT | Age: 52
End: 2022-11-01

## 2022-11-01 ENCOUNTER — OFFICE VISIT (OUTPATIENT)
Dept: PAIN MANAGEMENT | Age: 52
End: 2022-11-01
Payer: COMMERCIAL

## 2022-11-01 VITALS
BODY MASS INDEX: 40.34 KG/M2 | OXYGEN SATURATION: 92 % | HEART RATE: 106 BPM | HEIGHT: 67 IN | DIASTOLIC BLOOD PRESSURE: 70 MMHG | SYSTOLIC BLOOD PRESSURE: 102 MMHG | WEIGHT: 257 LBS

## 2022-11-01 DIAGNOSIS — M47.816 LUMBAR FACET JOINT SYNDROME: Primary | ICD-10-CM

## 2022-11-01 DIAGNOSIS — G89.29 CHRONIC LOW BACK PAIN, UNSPECIFIED BACK PAIN LATERALITY, UNSPECIFIED WHETHER SCIATICA PRESENT: ICD-10-CM

## 2022-11-01 DIAGNOSIS — M54.50 CHRONIC LOW BACK PAIN, UNSPECIFIED BACK PAIN LATERALITY, UNSPECIFIED WHETHER SCIATICA PRESENT: ICD-10-CM

## 2022-11-01 DIAGNOSIS — M54.06 PANNICULITIS INVOLVING LUMBAR REGION: ICD-10-CM

## 2022-11-01 PROCEDURE — 3017F COLORECTAL CA SCREEN DOC REV: CPT | Performed by: NURSE PRACTITIONER

## 2022-11-01 PROCEDURE — G8484 FLU IMMUNIZE NO ADMIN: HCPCS | Performed by: NURSE PRACTITIONER

## 2022-11-01 PROCEDURE — 4004F PT TOBACCO SCREEN RCVD TLK: CPT | Performed by: NURSE PRACTITIONER

## 2022-11-01 PROCEDURE — 99214 OFFICE O/P EST MOD 30 MIN: CPT | Performed by: NURSE PRACTITIONER

## 2022-11-01 PROCEDURE — G8417 CALC BMI ABV UP PARAM F/U: HCPCS | Performed by: NURSE PRACTITIONER

## 2022-11-01 PROCEDURE — 99215 OFFICE O/P EST HI 40 MIN: CPT | Performed by: NURSE PRACTITIONER

## 2022-11-01 PROCEDURE — G8427 DOCREV CUR MEDS BY ELIG CLIN: HCPCS | Performed by: NURSE PRACTITIONER

## 2022-11-01 RX ORDER — LOSARTAN POTASSIUM 100 MG/1
TABLET ORAL
COMMUNITY
Start: 2022-09-14

## 2022-11-01 ASSESSMENT — ENCOUNTER SYMPTOMS
SORE THROAT: 0
GASTROINTESTINAL NEGATIVE: 1
RESPIRATORY NEGATIVE: 1
BACK PAIN: 1

## 2022-11-01 NOTE — PATIENT INSTRUCTIONS
[facility]  [address]  Telephone [phone]  Fax [phone]      PROCEDURE INSTRUCTIONS FOR  PAIN MANAGEMENT PROCEDURES WITHOUT IV SEDATION    Sona Ramosdeborah Slick scheduled to see Dr. Christian Johns to undergo the following procedure:  Bilateral L4-L5 L5-S1 Facet Injection     Procedure Date: ____11/17/22____  You will receive a phone call the day prior to your procedure to confirm a time of arrival.    [facility] on the 1st floor in the hospital, after check in and signing of paper work you will then go to the second floor to the surgery center. 1. Stop the following medications prior to the procedure:    none   Stop blood thinners as directed before the injection, with permission from your cardiologist or primary care physician. We will send a letter to them requesting permission to hold the blood thinners. If you take Warfarin (Coumadin), you must have your blood drawn for an INR the day before the procedure. INR must be less than 1.5. if not complete prior to check in the procedure this will be drawn at the procedure center prior to having the procedure completed. 2.  Take all routine medications unless otherwise instructed. Ok to take vitamins and antiinflammatory medications    3. EATING & DRINKING:  Ok to eat or drink before the injection - no IV sedation will be used. 4. If you are allergic to contrast or iodine, you must take benadryl and prednisone prior to the injection to prevent an allergic reaction. Follow the directions on the prescription for the times to take the medication. 5. Oral valium can be prescribed if your are anxious about the injections or feel that you can not lay still during the injection. If you take valium, you must have a . Make arrangements for a family member or friend to drive you to the surgery center.   Your ride must stay in the hospital while you are having the injection done. If they cannot stay, the injection will be rescheduled. The valium may affect your judgment following the procedure and driving a vehicle within 24 hours after the sedation could be dangerous. 6.  Wear simple loose clothing, which can be easily changed. 7.  Leave jewelry (including rings) and other valuables at home. 8.  You will be asked to sign several forms prior to surgery; patients under the age of 25 must have a parent or legal guardian sign the permit to be able to do the procedure. 9.  You must have finished any antibiotic prescribed for recent infections. If required, please take pre-procedure antibiotic or other pre-procedure medications as instructed. 10. Bring inhalers and pain medications with you to your procedure. 11. Bring your MRI/CT films if they were done outside of the [facility]. 12. If you should develop a cold, sore throat, cough, fever or other new indication of illness or infection, or are started on antibiotics within 2 weeks of the scheduled procedure, please notify the [facility] office as early as possible at [phone]. If calling after 4:30pm the day prior to your scheduled procedure please contact [phone] and Leave a Voice Message.

## 2022-11-01 NOTE — PROGRESS NOTES
Subjective:      Patient ID: Sathish Schmidt is a 45 y.o. female. Chief Complaint   Patient presents with    Post-Op Check     HPI Post facet injections, >80% pain relief, lasted 10 days. Chronic low back pain-axial for years left side is worse, worsens as she ages. Ibuprofen prn, takes the edge off-one kidney, poor NSAID candidate. Started on gabapentin, no relief, makes her drowsy at lower dose. Failed cymbalta in the past, alleviated neuropathic pain, but then stopped working. Her goal is to return to work. 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 Standing on feet 10-12 hours per day    She has done physical therapy. No relief. No recent lumbar imaging. Evaluated neurosurgeon, she is not a surgical candidate.     Tramadol prn-makes her sleepy    Pain Assessment  Location of Pain: Back  Severity of Pain: 6  Quality of Pain: Throbbing, Aching  Duration of Pain: Persistent  Frequency of Pain: Constant  Aggravating Factors: Stairs, Walking, Standing, Squatting, Kneeling, Exercise, Straightening, Stretching, Bending  Limiting Behavior: Yes  Relieving Factors: Rest, Heat (meds)  Result of Injury: No  Work-Related Injury: No    No Known Allergies    Outpatient Medications Marked as Taking for the 11/1/22 encounter (Office Visit) with MONI Dupont - CNP   Medication Sig Dispense Refill    losartan (COZAAR) 100 MG tablet       gabapentin (NEURONTIN) 100 MG capsule TAKE ONE CAPSULE BY MOUTH EVERY NIGHT AT BEDTIME FOR 5 DAYS, THEN TAKE ONE CAPSULE BY MOUTH TWICE A DAY FOR 5 DAYS, THEN TAKE ONE CAPSULE BY MOUTH THREE TIMES A DAY 90 capsule 1    diclofenac (VOLTAREN) 75 MG EC tablet TAKE ONE TABLET BY MOUTH TWICE A DAY 60 tablet 3    FARXIGA 5 MG tablet       JANUVIA 100 MG tablet       atorvastatin (LIPITOR) 40 MG tablet       VICTOZA 18 MG/3ML SOPN SC injection       Omega-3 1000 MG CAPS Take by mouth         Past Medical History: Diagnosis Date    Chronic pain     Type 2 diabetes mellitus ([facility] [address]        Past Surgical History:   Procedure Laterality Date    CARPAL TUNNEL RELEASE  2016    HERNIA REPAIR  2018    OTHER SURGICAL HISTORY  2018    RFA     PAIN MANAGEMENT PROCEDURE Bilateral 10/6/2022    Bilateral L4-L5 L5-S1 FACET performed by Esperanza Skinner MD at [address]  2010    TUBAL LIGATION         Family History   Problem Relation Age of Onset    COPD Mother     Hypertension Mother     Hypertension Father     Diabetes Father        Social History     Socioeconomic History    Marital status: Single     Spouse name: None    Number of children: None    Years of education: None    Highest education level: None   Tobacco Use    Smoking status: Every Day     Packs/day: 1.00     Types: Cigarettes    Smokeless tobacco: Never    Tobacco comments: Will see PCP PRN cessation needs. Substance and Sexual Activity    Alcohol use: Never    Drug use: Never     Review of Systems   Constitutional:  Positive for activity change. Negative for fever. HENT:  Negative for sore throat. Respiratory: Negative. Cardiovascular:  Negative for leg swelling. Gastrointestinal: Negative. Genitourinary: Negative. Musculoskeletal:  Positive for arthralgias, back pain and myalgias. Skin: Negative. Neurological:  Positive for weakness (both knees). Hematological:  Bruises/bleeds easily. Psychiatric/Behavioral:  Positive for sleep disturbance. Objective:   Physical Exam  Vitals reviewed. Constitutional:       General: She is awake. She is not in acute distress. Appearance: Normal appearance. She is well-developed. She is obese. She is not ill-appearing, toxic-appearing or diaphoretic. Interventions: She is not intubated. HENT:      Head: Normocephalic and atraumatic.       Right Ear: External ear normal.      Left Ear: External ear normal.   Eyes:      General: Lids are normal.   Cardiovascular:      Rate and Rhythm: Normal rate. Pulmonary:      Effort: Pulmonary effort is normal. No tachypnea, bradypnea, accessory muscle usage, prolonged expiration, respiratory distress or retractions. She is not intubated. Abdominal:      General: Abdomen is protuberant. Palpations: Abdomen is soft. Musculoskeletal:      Lumbar back: Tenderness (left gluteal myofascia) present. Decreased range of motion (flexion is worse than extension). Skin:     General: Skin is warm and dry. Capillary Refill: Capillary refill takes less than 2 seconds. Coloration: Skin is not ashen, jaundiced or pale. Findings: No rash. Nails: There is no clubbing. Neurological:      Mental Status: She is alert and oriented to person, place, and time. GCS: GCS eye subscore is 4. GCS verbal subscore is 5. GCS motor subscore is 6. Cranial Nerves: No cranial nerve deficit. Psychiatric:         Attention and Perception: Attention and perception normal.         Mood and Affect: Mood and affect normal.         Speech: Speech normal.         Behavior: Behavior is cooperative. Cognition and Memory: Cognition normal.         Judgment: Judgment normal.       Assessment / Plan:       Diagnosis Orders   1. Lumbar facet joint syndrome        2. Chronic low back pain, unspecified back pain laterality, unspecified whether sciatica present        3. Panniculitis involving lumbar region          Schedule repeat bilateral L4/L5 and L5/S1 facet injections    Informed consent has not been obtained for procedure. Sona Beckerangélica not on blood thinners. Medications to hold have been reviewed with patient. Blood thinners must be held with permission from your cardiologist or primary care physician. A letter is not required to besent to PCP/Cardiologist regarding holding medications for procedure to decrease bleeding risk.        Controlled Substance Monitoring:    Acute and Chronic Pain Monitoring:   RX Monitoring 9/9/2022   Periodic Controlled Substance Monitoring No signs of potential drug abuse or diversion identified.

## 2022-12-01 ENCOUNTER — OFFICE VISIT (OUTPATIENT)
Dept: PAIN MANAGEMENT | Age: 52
End: 2022-12-01
Payer: COMMERCIAL

## 2022-12-01 ENCOUNTER — TELEPHONE (OUTPATIENT)
Dept: PAIN MANAGEMENT | Age: 52
End: 2022-12-01

## 2022-12-01 VITALS
WEIGHT: 256.25 LBS | HEART RATE: 90 BPM | HEIGHT: 67 IN | RESPIRATION RATE: 20 BRPM | DIASTOLIC BLOOD PRESSURE: 74 MMHG | SYSTOLIC BLOOD PRESSURE: 106 MMHG | OXYGEN SATURATION: 93 % | BODY MASS INDEX: 40.22 KG/M2

## 2022-12-01 DIAGNOSIS — G89.29 CHRONIC LOW BACK PAIN, UNSPECIFIED BACK PAIN LATERALITY, UNSPECIFIED WHETHER SCIATICA PRESENT: ICD-10-CM

## 2022-12-01 DIAGNOSIS — M51.36 DDD (DEGENERATIVE DISC DISEASE), LUMBAR: ICD-10-CM

## 2022-12-01 DIAGNOSIS — M51.26 LUMBAR DISC HERNIATION: Primary | ICD-10-CM

## 2022-12-01 DIAGNOSIS — M54.50 CHRONIC LOW BACK PAIN, UNSPECIFIED BACK PAIN LATERALITY, UNSPECIFIED WHETHER SCIATICA PRESENT: ICD-10-CM

## 2022-12-01 PROCEDURE — G8427 DOCREV CUR MEDS BY ELIG CLIN: HCPCS | Performed by: NURSE PRACTITIONER

## 2022-12-01 PROCEDURE — 3017F COLORECTAL CA SCREEN DOC REV: CPT | Performed by: NURSE PRACTITIONER

## 2022-12-01 PROCEDURE — 99214 OFFICE O/P EST MOD 30 MIN: CPT | Performed by: NURSE PRACTITIONER

## 2022-12-01 PROCEDURE — G8484 FLU IMMUNIZE NO ADMIN: HCPCS | Performed by: NURSE PRACTITIONER

## 2022-12-01 PROCEDURE — G8417 CALC BMI ABV UP PARAM F/U: HCPCS | Performed by: NURSE PRACTITIONER

## 2022-12-01 PROCEDURE — 4004F PT TOBACCO SCREEN RCVD TLK: CPT | Performed by: NURSE PRACTITIONER

## 2022-12-01 RX ORDER — BENZONATATE 100 MG/1
CAPSULE ORAL
COMMUNITY
Start: 2022-11-04

## 2022-12-01 RX ORDER — BUDESONIDE AND FORMOTEROL FUMARATE DIHYDRATE 160; 4.5 UG/1; UG/1
AEROSOL RESPIRATORY (INHALATION)
COMMUNITY
Start: 2022-11-21

## 2022-12-01 RX ORDER — FLUCONAZOLE 150 MG/1
TABLET ORAL
COMMUNITY
Start: 2022-11-14

## 2022-12-01 RX ORDER — ALBUTEROL SULFATE 90 UG/1
AEROSOL, METERED RESPIRATORY (INHALATION)
COMMUNITY
Start: 2022-11-29

## 2022-12-01 ASSESSMENT — ENCOUNTER SYMPTOMS
BACK PAIN: 1
RESPIRATORY NEGATIVE: 1
SORE THROAT: 0
GASTROINTESTINAL NEGATIVE: 1

## 2022-12-01 NOTE — TELEPHONE ENCOUNTER
Bilateral L4-L5 L5-S1 Facet Injection  with no  sedation scheduled for 12/23/22 with surgery center notified. Rose Marie Rogers

## 2022-12-01 NOTE — PROGRESS NOTES
Subjective:      Patient ID: Martin Olvera is a 45 y.o. female. Chief Complaint   Patient presents with    Back Pain     HPI Initial set facet injections, >80% pain relief, lasted 10 days only. Scheduled for another set, had to cancel due to illness, would like to reschedule    Chronic low back pain-axial for years left side is worse, worsens as she ages. Ibuprofen prn, takes the edge off-one kidney, poor NSAID candidate. Started on gabapentin, no relief, makes her drowsy at lower dose. Failed cymbalta in the past, alleviated neuropathic pain, but then stopped working. Her goal is to return to work. 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 Standing on feet 10-12 hours per day    She has done physical therapy. No relief. No recent lumbar imaging. Evaluated neurosurgeon, she is not a surgical candidate.     Tramadol prn-makes her sleepy    Pain Assessment  Location of Pain: Back  Location Modifiers: Posterior, Medial, Lateral  Severity of Pain: 6  Quality of Pain: Aching, Throbbing  Duration of Pain: Persistent  Frequency of Pain: Constant  Aggravating Factors:  (everything)  Limiting Behavior: Yes  Relieving Factors: Heat, Rest (meds)  Result of Injury: No  Work-Related Injury: No  Are there other pain locations you wish to document?: No    No Known Allergies    Outpatient Medications Marked as Taking for the 12/1/22 encounter (Office Visit) with MONI Rodriguez CNP   Medication Sig Dispense Refill    albuterol sulfate HFA (PROVENTIL;VENTOLIN;PROAIR) 108 (90 Base) MCG/ACT inhaler       benzonatate (TESSALON) 100 MG capsule       SYMBICORT 160-4.5 MCG/ACT AERO       fluconazole (DIFLUCAN) 150 MG tablet       losartan (COZAAR) 100 MG tablet       gabapentin (NEURONTIN) 100 MG capsule TAKE ONE CAPSULE BY MOUTH EVERY NIGHT AT BEDTIME FOR 5 DAYS, THEN TAKE ONE CAPSULE BY MOUTH TWICE A DAY FOR 5 DAYS, THEN TAKE ONE CAPSULE BY MOUTH THREE TIMES A DAY 90 capsule 1    diclofenac (VOLTAREN) 75 MG EC tablet TAKE ONE TABLET BY MOUTH TWICE A DAY 60 tablet 3    FARXIGA 5 MG tablet       JANUVIA 100 MG tablet       atorvastatin (LIPITOR) 40 MG tablet       VICTOZA 18 MG/3ML SOPN SC injection       Omega-3 1000 MG CAPS Take by mouth         Past Medical History:   Diagnosis Date    Chronic pain     Type 2 diabetes mellitus ([address]        Past Surgical History:   Procedure Laterality Date    CARPAL TUNNEL RELEASE  2016    HERNIA REPAIR  2018    OTHER SURGICAL HISTORY  2018    RFA     PAIN MANAGEMENT PROCEDURE Bilateral 10/6/2022    Bilateral L4-L5 L5-S1 FACET performed by Jerzy He MD at [address]  2010    TUBAL LIGATION         Family History   Problem Relation Age of Onset    COPD Mother     Hypertension Mother     Hypertension Father     Diabetes Father        Social History     Socioeconomic History    Marital status: Single     Spouse name: None    Number of children: None    Years of education: None    Highest education level: None   Tobacco Use    Smoking status: Every Day     Packs/day: 1.00     Types: Cigarettes    Smokeless tobacco: Never    Tobacco comments: Will see PCP PRN cessation needs. Substance and Sexual Activity    Alcohol use: Never    Drug use: Never     Review of Systems   Constitutional:  Positive for activity change. Negative for fever. HENT:  Negative for sore throat. Respiratory: Negative. Cardiovascular:  Negative for leg swelling. Gastrointestinal: Negative. Genitourinary: Negative. Musculoskeletal:  Positive for arthralgias, back pain and myalgias. Skin: Negative. Neurological:  Positive for weakness (both knees). Hematological:  Bruises/bleeds easily. Psychiatric/Behavioral:  Positive for sleep disturbance. Objective:   Physical Exam  Vitals reviewed. Constitutional:       General: She is awake. She is not in acute distress. Appearance: Normal appearance.  She is well-developed. She is obese. She is not ill-appearing, toxic-appearing or diaphoretic. Interventions: She is not intubated. HENT:      Head: Normocephalic and atraumatic. Right Ear: External ear normal.      Left Ear: External ear normal.   Eyes:      General: Lids are normal.   Cardiovascular:      Rate and Rhythm: Normal rate. Pulmonary:      Effort: Pulmonary effort is normal. No tachypnea, bradypnea, accessory muscle usage, prolonged expiration, respiratory distress or retractions. She is not intubated. Abdominal:      General: Abdomen is protuberant. Palpations: Abdomen is soft. Musculoskeletal:      Lumbar back: Tenderness (left gluteal myofascia) present. Decreased range of motion (flexion is worse than extension). Skin:     General: Skin is warm and dry. Capillary Refill: Capillary refill takes less than 2 seconds. Coloration: Skin is not ashen, jaundiced or pale. Findings: No rash. Nails: There is no clubbing. Neurological:      Mental Status: She is alert and oriented to person, place, and time. GCS: GCS eye subscore is 4. GCS verbal subscore is 5. GCS motor subscore is 6. Cranial Nerves: No cranial nerve deficit. Psychiatric:         Attention and Perception: Attention and perception normal.         Mood and Affect: Mood and affect normal.         Speech: Speech normal.         Behavior: Behavior is cooperative. Cognition and Memory: Cognition normal.         Judgment: Judgment normal.       Assessment / Plan:       Diagnosis Orders   1. Lumbar disc herniation        2. DDD (degenerative disc disease), lumbar        3. Chronic low back pain, unspecified back pain laterality, unspecified whether sciatica present          Reschedule epeat bilateral L4/L5 and L5/S1 facet injections    Informed consent has not been obtained for procedure. Sona Coon not on blood thinners. Medications to hold have been reviewed with patient. Blood thinners must be held with permission from your cardiologist or primary care physician. A letter is not required to besent to PCP/Cardiologist regarding holding medications for procedure to decrease bleeding risk. Controlled Substance Monitoring:    Acute and Chronic Pain Monitoring:   RX Monitoring 12/1/2022   Periodic Controlled Substance Monitoring No signs of potential drug abuse or diversion identified.

## 2022-12-22 ENCOUNTER — TELEPHONE (OUTPATIENT)
Dept: PAIN MANAGEMENT | Age: 52
End: 2022-12-22

## 2022-12-28 ENCOUNTER — APPOINTMENT (OUTPATIENT)
Dept: GENERAL RADIOLOGY | Age: 52
End: 2022-12-28
Attending: PHYSICAL MEDICINE & REHABILITATION
Payer: COMMERCIAL

## 2022-12-28 ENCOUNTER — HOSPITAL ENCOUNTER (OUTPATIENT)
Age: 52
Setting detail: OUTPATIENT SURGERY
Discharge: HOME | End: 2022-12-28
Attending: PHYSICAL MEDICINE & REHABILITATION | Admitting: PHYSICAL MEDICINE & REHABILITATION
Payer: COMMERCIAL

## 2022-12-28 VITALS
HEART RATE: 95 BPM | HEIGHT: 67 IN | OXYGEN SATURATION: 95 % | WEIGHT: 255 LBS | SYSTOLIC BLOOD PRESSURE: 112 MMHG | TEMPERATURE: 98.4 F | RESPIRATION RATE: 16 BRPM | DIASTOLIC BLOOD PRESSURE: 79 MMHG | BODY MASS INDEX: 40.02 KG/M2

## 2022-12-28 PROCEDURE — 6360000002 HC RX W HCPCS: Performed by: PHYSICAL MEDICINE & REHABILITATION

## 2022-12-28 PROCEDURE — 6360000004 HC RX CONTRAST MEDICATION: Performed by: PHYSICAL MEDICINE & REHABILITATION

## 2022-12-28 PROCEDURE — 2709999900 HC NON-CHARGEABLE SUPPLY: Performed by: PHYSICAL MEDICINE & REHABILITATION

## 2022-12-28 PROCEDURE — 7100000011 HC PHASE II RECOVERY - ADDTL 15 MIN: Performed by: PHYSICAL MEDICINE & REHABILITATION

## 2022-12-28 PROCEDURE — 64493 INJ PARAVERT F JNT L/S 1 LEV: CPT | Performed by: PHYSICAL MEDICINE & REHABILITATION

## 2022-12-28 PROCEDURE — 64494 INJ PARAVERT F JNT L/S 2 LEV: CPT | Performed by: PHYSICAL MEDICINE & REHABILITATION

## 2022-12-28 PROCEDURE — 3209999900 FLUORO FOR SURGICAL PROCEDURES

## 2022-12-28 PROCEDURE — 3600000057 HC PAIN LEVEL 4 ADDL 15 MIN: Performed by: PHYSICAL MEDICINE & REHABILITATION

## 2022-12-28 PROCEDURE — 2500000003 HC RX 250 WO HCPCS: Performed by: PHYSICAL MEDICINE & REHABILITATION

## 2022-12-28 PROCEDURE — 7100000010 HC PHASE II RECOVERY - FIRST 15 MIN: Performed by: PHYSICAL MEDICINE & REHABILITATION

## 2022-12-28 PROCEDURE — 3600000056 HC PAIN LEVEL 4 BASE: Performed by: PHYSICAL MEDICINE & REHABILITATION

## 2022-12-28 RX ORDER — LIDOCAINE HYDROCHLORIDE 20 MG/ML
INJECTION, SOLUTION EPIDURAL; INFILTRATION; INTRACAUDAL; PERINEURAL PRN
Status: DISCONTINUED | OUTPATIENT
Start: 2022-12-28 | End: 2022-12-28 | Stop reason: ALTCHOICE

## 2022-12-28 RX ORDER — SODIUM CHLORIDE 0.9 % (FLUSH) 0.9 %
5-40 SYRINGE (ML) INJECTION EVERY 12 HOURS SCHEDULED
Status: DISCONTINUED | OUTPATIENT
Start: 2022-12-28 | End: 2022-12-28 | Stop reason: HOSPADM

## 2022-12-28 RX ORDER — SODIUM CHLORIDE 9 MG/ML
INJECTION, SOLUTION INTRAVENOUS PRN
Status: DISCONTINUED | OUTPATIENT
Start: 2022-12-28 | End: 2022-12-28 | Stop reason: HOSPADM

## 2022-12-28 RX ORDER — SODIUM CHLORIDE 0.9 % (FLUSH) 0.9 %
5-40 SYRINGE (ML) INJECTION PRN
Status: DISCONTINUED | OUTPATIENT
Start: 2022-12-28 | End: 2022-12-28 | Stop reason: HOSPADM

## 2022-12-28 RX ORDER — DEXAMETHASONE SODIUM PHOSPHATE 10 MG/ML
INJECTION INTRAMUSCULAR; INTRAVENOUS PRN
Status: DISCONTINUED | OUTPATIENT
Start: 2022-12-28 | End: 2022-12-28 | Stop reason: ALTCHOICE

## 2022-12-28 ASSESSMENT — PAIN DESCRIPTION - LOCATION: LOCATION: BACK

## 2022-12-28 ASSESSMENT — PAIN DESCRIPTION - DESCRIPTORS: DESCRIPTORS: ACHING

## 2022-12-28 ASSESSMENT — ENCOUNTER SYMPTOMS
SORE THROAT: 0
BACK PAIN: 1
GASTROINTESTINAL NEGATIVE: 1
RESPIRATORY NEGATIVE: 1

## 2022-12-28 ASSESSMENT — PAIN SCALES - GENERAL: PAINLEVEL_OUTOF10: 3

## 2022-12-28 ASSESSMENT — PAIN - FUNCTIONAL ASSESSMENT: PAIN_FUNCTIONAL_ASSESSMENT: 0-10

## 2022-12-28 ASSESSMENT — PAIN DESCRIPTION - PAIN TYPE: TYPE: CHRONIC PAIN

## 2022-12-28 NOTE — H&P
Subjective:      Patient ID: Benita Goldberg is a 45 y.o. female. Chief Complaint   Patient presents with    Back Pain     HPI Initial set facet injections, >80% pain relief, lasted 10 days only. Scheduled for another set, had to cancel due to illness, would like to reschedule    Chronic low back pain-axial for years left side is worse, worsens as she ages. Ibuprofen prn, takes the edge off-one kidney, poor NSAID candidate. Started on gabapentin, no relief, makes her drowsy at lower dose. Failed cymbalta in the past, alleviated neuropathic pain, but then stopped working. Her goal is to return to work. 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 Standing on feet 10-12 hours per day    She has done physical therapy. No relief. No recent lumbar imaging. Evaluated neurosurgeon, she is not a surgical candidate.     Tramadol prn-makes her sleepy    Pain Assessment  Location of Pain: Back  Location Modifiers: Posterior, Medial, Lateral  Severity of Pain: 6  Quality of Pain: Aching, Throbbing  Duration of Pain: Persistent  Frequency of Pain: Constant  Aggravating Factors:  (everything)  Limiting Behavior: Yes  Relieving Factors: Heat, Rest (meds)  Result of Injury: No  Work-Related Injury: No  Are there other pain locations you wish to document?: No    No Known Allergies    Outpatient Medications Marked as Taking for the 12/1/22 encounter (Office Visit) with MONI Tellez CNP   Medication Sig Dispense Refill    albuterol sulfate HFA (PROVENTIL;VENTOLIN;PROAIR) 108 (90 Base) MCG/ACT inhaler       benzonatate (TESSALON) 100 MG capsule       SYMBICORT 160-4.5 MCG/ACT AERO       fluconazole (DIFLUCAN) 150 MG tablet       losartan (COZAAR) 100 MG tablet       gabapentin (NEURONTIN) 100 MG capsule TAKE ONE CAPSULE BY MOUTH EVERY NIGHT AT BEDTIME FOR 5 DAYS, THEN TAKE ONE CAPSULE BY MOUTH TWICE A DAY FOR 5 DAYS, THEN TAKE ONE CAPSULE BY MOUTH THREE TIMES A DAY 90 capsule 1    diclofenac (VOLTAREN) 75 MG EC tablet TAKE ONE TABLET BY MOUTH TWICE A DAY 60 tablet 3    FARXIGA 5 MG tablet       JANUVIA 100 MG tablet       atorvastatin (LIPITOR) 40 MG tablet       VICTOZA 18 MG/3ML SOPN SC injection       Omega-3 1000 MG CAPS Take by mouth         Past Medical History:   Diagnosis Date    Chronic pain     Type 2 diabetes mellitus ([address]        Past Surgical History:   Procedure Laterality Date    CARPAL TUNNEL RELEASE  2016    HERNIA REPAIR  2018    OTHER SURGICAL HISTORY  2018    RFA     PAIN MANAGEMENT PROCEDURE Bilateral 10/6/2022    Bilateral L4-L5 L5-S1 FACET performed by Bhupinder Rm MD at [address] [place] [address]    TUBAL LIGATION         Family History   Problem Relation Age of Onset    COPD Mother     Hypertension Mother     Hypertension Father     Diabetes Father        Social History     Socioeconomic History    Marital status: Single     Spouse name: None    Number of children: None    Years of education: None    Highest education level: None   Tobacco Use    Smoking status: Every Day     Packs/day: 1.00     Types: Cigarettes    Smokeless tobacco: Never    Tobacco comments: Will see PCP PRN cessation needs. Substance and Sexual Activity    Alcohol use: Never    Drug use: Never     Review of Systems   Constitutional:  Positive for activity change. Negative for fever. HENT:  Negative for sore throat. Respiratory: Negative. Cardiovascular:  Negative for leg swelling. Gastrointestinal: Negative. Genitourinary: Negative. Musculoskeletal:  Positive for arthralgias, back pain and myalgias. Skin: Negative. Neurological:  Positive for weakness (both knees). Hematological:  Bruises/bleeds easily. Psychiatric/Behavioral:  Positive for sleep disturbance. Objective:   Physical Exam  Vitals reviewed. Constitutional:       General: She is awake. She is not in acute distress. Appearance: Normal appearance.  She is well-developed. She is obese. She is not ill-appearing, toxic-appearing or diaphoretic. Interventions: She is not intubated. HENT:      Head: Normocephalic and atraumatic. Right Ear: External ear normal.      Left Ear: External ear normal.   Eyes:      General: Lids are normal.   Cardiovascular:      Rate and Rhythm: Normal rate. Pulmonary:      Effort: Pulmonary effort is normal. No tachypnea, bradypnea, accessory muscle usage, prolonged expiration, respiratory distress or retractions. She is not intubated. Abdominal:      General: Abdomen is protuberant. Palpations: Abdomen is soft. Musculoskeletal:      Lumbar back: Tenderness (left gluteal myofascia) present. Decreased range of motion (flexion is worse than extension). Skin:     General: Skin is warm and dry. Capillary Refill: Capillary refill takes less than 2 seconds. Coloration: Skin is not ashen, jaundiced or pale. Findings: No rash. Nails: There is no clubbing. Neurological:      Mental Status: She is alert and oriented to person, place, and time. GCS: GCS eye subscore is 4. GCS verbal subscore is 5. GCS motor subscore is 6. Cranial Nerves: No cranial nerve deficit. Psychiatric:         Attention and Perception: Attention and perception normal.         Mood and Affect: Mood and affect normal.         Speech: Speech normal.         Behavior: Behavior is cooperative. Cognition and Memory: Cognition normal.         Judgment: Judgment normal.       Assessment / Plan:       Diagnosis Orders   1. Lumbar disc herniation        2. DDD (degenerative disc disease), lumbar        3. Chronic low back pain, unspecified back pain laterality, unspecified whether sciatica present          Reschedule epeat bilateral L4/L5 and L5/S1 facet injections    Informed consent has not been obtained for procedure. Sona Coon not on blood thinners. Medications to hold have been reviewed with patient. Blood thinners must be held with permission from your cardiologist or primary care physician. A letter is not required to besent to PCP/Cardiologist regarding holding medications for procedure to decrease bleeding risk. Controlled Substance Monitoring:    Acute and Chronic Pain Monitoring:   RX Monitoring 12/1/2022   Periodic Controlled Substance Monitoring No signs of potential drug abuse or diversion identified.

## 2022-12-28 NOTE — OP NOTE
ZYGAPOPHYSEAL JOINT INJECTION    12/28/22    Surgeon: Lucien Brooks MD    Pre-operative Diagnosis:   Hospital Problems             Last Modified POA    * (Principal) Lumbar facet joint syndrome 12/28/2022 Yes    Panniculitis involving lumbar region 12/28/2022 Yes        Post-operative Diagnosis: Same    INDICATION:  Previous treatment and examination findings are noted in the H&P and previous clinic notes. Bilateral L4-55 -S1 facet joint injections have been requested for diagnostic and therapeutic reasons. Conservative treatment was ineffective i.e.: ice, NSAIDS, rest, narcotic medication, chiropractic care, physical therapy and message therapy. Patient is unable to perform the following ADL's: ambulating, grooming, sitting, and standing    Pain Assessment: 0-10  Pain Level: 5     Pain Orientation: Right, Left  Pain Location: Back  Pain Descriptors: Aching    Last Plain films: 2021      EXAMINATION:   B l4-55 -S1 facet joint injections with arthrography. CONSENT:  Written consent was obtained from the patient on preprinted consent form after explaining the procedure, indications, potential complications and outcomes. Alternative treatments were also discussed. DISCUSSION:  The patient was sterilely prepped and draped in the usual fashion in the prone position. Time out\" was verified for correct patient, side, level and procedure. SEDATION:  No conscious sedation was performed during the procedure. The patient remained awake and conversed throughout the procedure. The patient underwent pulse oximetry and blood pressure monitoring independently by a trained observer, as well as by a physician. PROCEDURE[de-identified]  Under image-intensifier control, a standard technique was employed, a 22 gauge needle x 5 inch spinal needle was guided successfully to cannulate the B L4-5 5-S1 zygapophyseal joint via a posterior lateral approach.     Instillation of .5 mL of Isovue Mcontrast medium demonstrated contiguous flow into the joint and the joint capsule. No vascular spread was noted. Digital subtraction was not employed to evaluate for vascular spread.] The patient was monitored for any untoward reaction to contrast medium before proceeding with procedure #2. Dexamethasone (Decadron 10 mg/mL) was injected into each joint. A total of 4 joints were injected. PROVOCATION: The patient did not report pain reproduction in a concordant distribution upon capsular distention of the lumbar facet joints from the contrast injection. ARTHROGRAPHY: The lumbar facet arthrogram revealed contrast in the joint space in the superior and inferior recesses; no contrast extravasation. The patient tolerated the procedure well and without complications and was noted to be in stable condition prior to discharge from the procedure center with discharge instructions. IMPRESSIONS:  lumbar facet arthrogram is abnormal: -.  lumbar facet joint injection negative for provocation of the patient's pain symptoms. EBL: no blood loss    SPECIMEN: none    RECOMMENDATIONS:  Complete and return Post-Procedure Pain and Activity Diary.   Contact the [address] for symptom exacerbation, fever or unusual symptoms. Post-procedure care according to verbal and written discharge instructions  Continue with PT as per MD directions. Consider diagnositc MBB if there is > 80% pain relief and improved activity scores. SPINE FLUOROSCOPIC IMAGE INTERPRETATION    EXAMINATION:  AP, Lateral, and Oblique views. FLUORO TIME: 11 seconds    DISCUSSION:  Spot views of the spine reveal normal alignment and segmentation. Spinal needles are positioned in the lumbar facet joint. Contrast outlines the joint space and reveals -. Visualized spine reveals disc space B L4-55 -S1. Soft tissues reveal no abnormalities.     IMPRESSION: lumbar facet arthrogram shows satisfactory needle placement and contrast dispersal.    Electronically signed by Esperanza Skinner MD on 12/28/2022 at 7:38 AM.

## 2022-12-28 NOTE — DISCHARGE INSTRUCTIONS
Home Care after Facet Joint Injection    The doctor has done an injection to help diagnose the cause and site of your pain. You may feel sore at the injection site for the next 2-4 days. You may apply ice to the site for 20 minutes on and 20 minutes off to decrease pain and discomfort, if needed, for the first 24 hours. After 24 hours, you may use heat if needed. You will be given a pain log to complete for the next 14 days. Complete this form and make a copy for your own records. Then, mail it back to us or drop it off at the pain management clinic. We will need this information to decide the next step in your treatment plan. It is important that you do the activities that most often cause or intensify your pain the first 24 hours after the injection. Record your results on the pain log as directed. Do not nap. Try to limit the use of pain medicines if you can during the first 24 hours. You may resume taking your medicines after the procedure. Our staff will tell you how to take your pain medicines. No baths or soaking the injection site for 24 hours after the procedure. Taking a shower today is okay. You may resume taking your routine medicines after the procedure including pain medicines as prescribed. Remember to limit the use of pain medications the first 24 hours. Resume any medications held for the procedure (blood thinners, aspirin, anti-inflammatories). If you do not already have a follow-up appointment, call your referring doctors office to make one to discuss your results within 2-4 weeks after the treatment. Your doctor will have the report within 7-10 days. Signs of infection  Fever greater than 100.4°F by mouth for 2 readings taken 4 hours apart  Increased redness, swelling around the site  Any drainage from the site     If you have any new symptoms or any signs of infection, please call [phone] during business hours to notify us.  You can also notify your primary care physician. After hours, nights and weekends, call [phone].

## 2022-12-28 NOTE — INTERVAL H&P NOTE
I have interviewed and examined the patient and reviewed the recent History and Physical.  There have been no changes to the recent H&P documentation. The surgical consent form has been signed. Last anticoagulant medication use was:-    Premedication taken for contrast allergy? No    Valium taken for oral sedation? No    No outpatient medications have been marked as taking for the 12/28/22 encounter [facility] Encounter). The patient understands the planned operation and its associated risks and benefits and agrees to proceed.         Electronically signed by Jake Ace MD on 12/28/2022 at 7:36 AM

## 2023-01-13 ENCOUNTER — OFFICE VISIT (OUTPATIENT)
Dept: PAIN MANAGEMENT | Age: 53
End: 2023-01-13
Payer: COMMERCIAL

## 2023-01-13 VITALS
RESPIRATION RATE: 18 BRPM | WEIGHT: 247.38 LBS | SYSTOLIC BLOOD PRESSURE: 108 MMHG | BODY MASS INDEX: 38.83 KG/M2 | HEIGHT: 67 IN | DIASTOLIC BLOOD PRESSURE: 78 MMHG | OXYGEN SATURATION: 95 % | HEART RATE: 88 BPM

## 2023-01-13 DIAGNOSIS — M47.816 LUMBAR FACET JOINT SYNDROME: ICD-10-CM

## 2023-01-13 DIAGNOSIS — M54.16 LUMBAR RADICULOPATHY: ICD-10-CM

## 2023-01-13 DIAGNOSIS — M51.26 LUMBAR DISC HERNIATION: Primary | ICD-10-CM

## 2023-01-13 PROCEDURE — G8427 DOCREV CUR MEDS BY ELIG CLIN: HCPCS | Performed by: NURSE PRACTITIONER

## 2023-01-13 PROCEDURE — 99214 OFFICE O/P EST MOD 30 MIN: CPT | Performed by: NURSE PRACTITIONER

## 2023-01-13 PROCEDURE — G8484 FLU IMMUNIZE NO ADMIN: HCPCS | Performed by: NURSE PRACTITIONER

## 2023-01-13 PROCEDURE — G8417 CALC BMI ABV UP PARAM F/U: HCPCS | Performed by: NURSE PRACTITIONER

## 2023-01-13 PROCEDURE — 4004F PT TOBACCO SCREEN RCVD TLK: CPT | Performed by: NURSE PRACTITIONER

## 2023-01-13 PROCEDURE — 3017F COLORECTAL CA SCREEN DOC REV: CPT | Performed by: NURSE PRACTITIONER

## 2023-01-13 RX ORDER — HYDROCODONE BITARTRATE AND ACETAMINOPHEN 5; 325 MG/1; MG/1
1 TABLET ORAL EVERY 8 HOURS PRN
Qty: 90 TABLET | Refills: 0 | Status: SHIPPED | OUTPATIENT
Start: 2023-01-13 | End: 2023-02-12

## 2023-01-13 RX ORDER — BUPROPION HYDROCHLORIDE 150 MG/1
TABLET ORAL
COMMUNITY
Start: 2022-12-13

## 2023-01-13 RX ORDER — METHOCARBAMOL 750 MG/1
TABLET, FILM COATED ORAL
COMMUNITY
Start: 2023-01-09

## 2023-01-13 ASSESSMENT — ENCOUNTER SYMPTOMS
RESPIRATORY NEGATIVE: 1
GASTROINTESTINAL NEGATIVE: 1
SORE THROAT: 0
BACK PAIN: 1

## 2023-01-13 NOTE — PROGRESS NOTES
Subjective:      Patient ID: Carolyn Rankin is a 45 y.o. female. Chief Complaint   Patient presents with    Back Pain     Lower       HPI Here today for routine pain clinic recheck. Moving to [facility], [facility]    Chronic low back pain-axial for years left side is worse, worsens as she ages. Ibuprofen prn, takes the edge off-one kidney, poor NSAID candidate. Started on gabapentin, no relief, makes her drowsy at lower dose. Failed cymbalta in the past, alleviated neuropathic pain, but then stopped working. She has done physical therapy. No relief. No recent lumbar imaging. Evaluated neurosurgeon, she is not a surgical candidate. Tramadol prn-makes her sleepy, ineffective    Pain Assessment  Location of Pain: Back  Location Modifiers: Posterior, Medial, Lateral  Severity of Pain: 4  Quality of Pain: Dull, Aching  Duration of Pain: Persistent  Frequency of Pain: Constant  Aggravating Factors: Standing, Bending, Stairs, Squatting, Kneeling  Limiting Behavior: Yes  Relieving Factors: Rest, Heat (meds)  Result of Injury: No  Work-Related Injury: No  Are there other pain locations you wish to document?: No    No Known Allergies    Outpatient Medications Marked as Taking for the 1/13/23 encounter (Office Visit) with MONI Moreno CNP   Medication Sig Dispense Refill    buPROPion (WELLBUTRIN XL) 150 MG extended release tablet       methocarbamol (ROBAXIN) 750 MG tablet       HYDROcodone-acetaminophen (NORCO) 5-325 MG per tablet Take 1 tablet by mouth every 8 hours as needed for Pain for up to 30 days.  Max Daily Amount: 3 tablets 90 tablet 0    albuterol sulfate HFA (PROVENTIL;VENTOLIN;PROAIR) 108 (90 Base) MCG/ACT inhaler       benzonatate (TESSALON) 100 MG capsule       SYMBICORT 160-4.5 MCG/ACT AERO       fluconazole (DIFLUCAN) 150 MG tablet       losartan (COZAAR) 100 MG tablet       gabapentin (NEURONTIN) 100 MG capsule TAKE ONE CAPSULE BY MOUTH EVERY NIGHT AT BEDTIME FOR 5 DAYS, THEN TAKE ONE CAPSULE BY MOUTH TWICE A DAY FOR 5 DAYS, THEN TAKE ONE CAPSULE BY MOUTH THREE TIMES A DAY 90 capsule 1    diclofenac (VOLTAREN) 75 MG EC tablet TAKE ONE TABLET BY MOUTH TWICE A DAY 60 tablet 3    FARXIGA 5 MG tablet       JANUVIA 100 MG tablet       atorvastatin (LIPITOR) 40 MG tablet       VICTOZA 18 MG/3ML SOPN SC injection       Omega-3 1000 MG CAPS Take by mouth         Past Medical History:   Diagnosis Date    Chronic pain     Type 2 diabetes mellitus ([address]        Past Surgical History:   Procedure Laterality Date    CARPAL TUNNEL RELEASE  2016    HERNIA REPAIR  2018    OTHER SURGICAL HISTORY  2018    RFA     PAIN MANAGEMENT PROCEDURE Bilateral 10/6/2022    Bilateral L4-L5 L5-S1 FACET performed by George Ochoa MD at [address] Bilateral 12/28/2022    Bilateral L4-L5 L5-S1 FACET performed by George Ochoa MD at [address] [place] [address]  2010    TUBAL LIGATION         Family History   Problem Relation Age of Onset    COPD Mother     Hypertension Mother     Hypertension Father     Diabetes Father        Social History     Socioeconomic History    Marital status: Single     Spouse name: None    Number of children: None    Years of education: None    Highest education level: None   Tobacco Use    Smoking status: Every Day     Packs/day: 1.00     Types: Cigarettes    Smokeless tobacco: Never    Tobacco comments: Will see PCP PRN cessation needs. Substance and Sexual Activity    Alcohol use: Never    Drug use: Never     Review of Systems   Constitutional:  Positive for activity change. Negative for fever. HENT:  Negative for sore throat. Respiratory: Negative. Cardiovascular:  Negative for leg swelling. Gastrointestinal: Negative. Genitourinary: Negative. Musculoskeletal:  Positive for arthralgias, back pain and myalgias. Skin: Negative. Neurological:  Positive for weakness (both knees). Hematological:  Bruises/bleeds easily.    Psychiatric/Behavioral:  Positive for sleep disturbance. Objective:   Physical Exam  Vitals reviewed. Constitutional:       General: She is awake. She is not in acute distress. Appearance: Normal appearance. She is well-developed. She is obese. She is not ill-appearing, toxic-appearing or diaphoretic. Interventions: She is not intubated. HENT:      Head: Normocephalic and atraumatic. Right Ear: External ear normal.      Left Ear: External ear normal.   Eyes:      General: Lids are normal.   Cardiovascular:      Rate and Rhythm: Normal rate. Pulmonary:      Effort: Pulmonary effort is normal. No tachypnea, bradypnea, accessory muscle usage, prolonged expiration, respiratory distress or retractions. She is not intubated. Abdominal:      General: Abdomen is protuberant. Palpations: Abdomen is soft. Musculoskeletal:      Lumbar back: Tenderness (left gluteal myofascia) present. Decreased range of motion (flexion is worse than extension). Skin:     General: Skin is warm and dry. Capillary Refill: Capillary refill takes less than 2 seconds. Coloration: Skin is not ashen, jaundiced or pale. Findings: No rash. Nails: There is no clubbing. Neurological:      Mental Status: She is alert and oriented to person, place, and time. GCS: GCS eye subscore is 4. GCS verbal subscore is 5. GCS motor subscore is 6. Cranial Nerves: No cranial nerve deficit. Psychiatric:         Attention and Perception: Attention and perception normal.         Mood and Affect: Mood and affect normal.         Speech: Speech normal.         Behavior: Behavior is cooperative. Cognition and Memory: Cognition normal.         Judgment: Judgment normal.       Assessment / Plan:       Diagnosis Orders   1. Lumbar disc herniation  HYDROcodone-acetaminophen (NORCO) 5-325 MG per tablet      2. Lumbar facet joint syndrome        3.  Lumbar radiculopathy          Norco rx    No follow up needed, may follow up prn if she moves back to [place]    Controlled Substance Monitoring:    Acute and Chronic Pain Monitoring:   RX Monitoring 1/13/2023   Periodic Controlled Substance Monitoring No signs of potential drug abuse or diversion identified.

## 2025-05-23 ENCOUNTER — TRANSCRIBE ORDERS (OUTPATIENT)
Dept: GENERAL RADIOLOGY | Age: 55
End: 2025-05-23

## 2025-05-23 DIAGNOSIS — Z12.31 ENCOUNTER FOR SCREENING MAMMOGRAM FOR MALIGNANT NEOPLASM OF BREAST: Primary | ICD-10-CM

## 2025-06-10 ENCOUNTER — HOSPITAL ENCOUNTER (OUTPATIENT)
Dept: MAMMOGRAPHY | Age: 55
Discharge: HOME | End: 2025-06-12
Payer: COMMERCIAL

## 2025-06-10 VITALS — BODY MASS INDEX: 40.18 KG/M2 | HEIGHT: 66 IN | WEIGHT: 250 LBS

## 2025-06-10 DIAGNOSIS — Z12.31 ENCOUNTER FOR SCREENING MAMMOGRAM FOR MALIGNANT NEOPLASM OF BREAST: ICD-10-CM

## 2025-06-10 PROCEDURE — 77063 BREAST TOMOSYNTHESIS BI: CPT

## (undated) DEVICE — TRAY PAIN CUST

## (undated) DEVICE — LINE SAMP O2 6.5FT W/FEMALE CONN F/ADULT CAPNOLINE PLUS

## (undated) DEVICE — CANNULA SMART CAPNOLINE PLUS 02

## (undated) DEVICE — NEEDLE, QUINCKE, 22GX5": Brand: MEDLINE

## (undated) DEVICE — BANDAGE ADH DIA7/8IN NAT FLEX-FABRIC WVN FOR WND PROTCT

## (undated) DEVICE — GLOVE SURG SZ 8 L12IN THK91MIL BRN LTX FREE POLYCHLOROPRENE

## (undated) DEVICE — GLOVE ORANGE PI 8   MSG9080

## (undated) DEVICE — CHLORAPREP 26ML CLEAR